# Patient Record
Sex: MALE | Race: WHITE | Employment: FULL TIME | URBAN - METROPOLITAN AREA
[De-identification: names, ages, dates, MRNs, and addresses within clinical notes are randomized per-mention and may not be internally consistent; named-entity substitution may affect disease eponyms.]

---

## 2022-11-09 ENCOUNTER — APPOINTMENT (OUTPATIENT)
Dept: CT IMAGING | Age: 37
End: 2022-11-09
Payer: COMMERCIAL

## 2022-11-09 ENCOUNTER — HOSPITAL ENCOUNTER (EMERGENCY)
Age: 37
Discharge: HOME | End: 2022-11-09
Attending: EMERGENCY MEDICINE
Payer: COMMERCIAL

## 2022-11-09 ENCOUNTER — APPOINTMENT (OUTPATIENT)
Dept: GENERAL RADIOLOGY | Age: 37
End: 2022-11-09
Payer: COMMERCIAL

## 2022-11-09 VITALS
HEART RATE: 89 BPM | HEIGHT: 74 IN | BODY MASS INDEX: 35.29 KG/M2 | SYSTOLIC BLOOD PRESSURE: 154 MMHG | TEMPERATURE: 98.1 F | OXYGEN SATURATION: 96 % | RESPIRATION RATE: 21 BRPM | DIASTOLIC BLOOD PRESSURE: 88 MMHG | WEIGHT: 275 LBS

## 2022-11-09 DIAGNOSIS — R00.2 PALPITATIONS: ICD-10-CM

## 2022-11-09 DIAGNOSIS — R91.1 LUNG NODULE: Primary | ICD-10-CM

## 2022-11-09 DIAGNOSIS — I51.7 LVH (LEFT VENTRICULAR HYPERTROPHY): ICD-10-CM

## 2022-11-09 LAB
ALBUMIN SERPL-MCNC: 4.7 G/DL (ref 3.5–5.2)
ALBUMIN/GLOBULIN RATIO: 1.6 (ref 1–2.5)
ALP BLD-CCNC: 88 U/L (ref 40–129)
ALT SERPL-CCNC: 67 U/L (ref 5–41)
ANION GAP SERPL CALCULATED.3IONS-SCNC: 10 MMOL/L (ref 9–17)
AST SERPL-CCNC: 31 U/L
BILIRUB SERPL-MCNC: 0.3 MG/DL (ref 0.3–1.2)
BUN BLDV-MCNC: 11 MG/DL (ref 6–20)
CALCIUM SERPL-MCNC: 9.8 MG/DL (ref 8.6–10.4)
CHLORIDE BLD-SCNC: 102 MMOL/L (ref 98–107)
CO2: 26 MMOL/L (ref 20–31)
CREAT SERPL-MCNC: 0.93 MG/DL (ref 0.7–1.2)
D-DIMER QUANTITATIVE: 1.46 MG/L FEU
GFR SERPL CREATININE-BSD FRML MDRD: >60 ML/MIN/1.73M2
GLUCOSE BLD-MCNC: 117 MG/DL (ref 70–99)
HCT VFR BLD CALC: 48.7 % (ref 41–53)
HEMOGLOBIN: 16.7 G/DL (ref 13.5–17.5)
MAGNESIUM: 2.2 MG/DL (ref 1.6–2.6)
MCH RBC QN AUTO: 29.7 PG (ref 26–34)
MCHC RBC AUTO-ENTMCNC: 34.4 G/DL (ref 31–37)
MCV RBC AUTO: 86.5 FL (ref 80–100)
PDW BLD-RTO: 13 % (ref 12.5–15.4)
PLATELET # BLD: 242 K/UL (ref 140–450)
PMV BLD AUTO: 8.9 FL (ref 6–12)
POTASSIUM SERPL-SCNC: 4 MMOL/L (ref 3.7–5.3)
PRO-BNP: 35 PG/ML
RBC # BLD: 5.63 M/UL (ref 4.5–5.9)
SODIUM BLD-SCNC: 138 MMOL/L (ref 135–144)
TOTAL PROTEIN: 7.7 G/DL (ref 6.4–8.3)
TROPONIN, HIGH SENSITIVITY: 6 NG/L (ref 0–22)
TROPONIN, HIGH SENSITIVITY: 7 NG/L (ref 0–22)
WBC # BLD: 7.8 K/UL (ref 3.5–11)

## 2022-11-09 PROCEDURE — 84484 ASSAY OF TROPONIN QUANT: CPT

## 2022-11-09 PROCEDURE — 2580000003 HC RX 258: Performed by: EMERGENCY MEDICINE

## 2022-11-09 PROCEDURE — 99285 EMERGENCY DEPT VISIT HI MDM: CPT

## 2022-11-09 PROCEDURE — 83880 ASSAY OF NATRIURETIC PEPTIDE: CPT

## 2022-11-09 PROCEDURE — 71260 CT THORAX DX C+: CPT | Performed by: EMERGENCY MEDICINE

## 2022-11-09 PROCEDURE — 85027 COMPLETE CBC AUTOMATED: CPT

## 2022-11-09 PROCEDURE — 71045 X-RAY EXAM CHEST 1 VIEW: CPT

## 2022-11-09 PROCEDURE — 93005 ELECTROCARDIOGRAM TRACING: CPT | Performed by: EMERGENCY MEDICINE

## 2022-11-09 PROCEDURE — 80053 COMPREHEN METABOLIC PANEL: CPT

## 2022-11-09 PROCEDURE — 6360000004 HC RX CONTRAST MEDICATION: Performed by: EMERGENCY MEDICINE

## 2022-11-09 PROCEDURE — 85379 FIBRIN DEGRADATION QUANT: CPT

## 2022-11-09 PROCEDURE — 36415 COLL VENOUS BLD VENIPUNCTURE: CPT

## 2022-11-09 PROCEDURE — 83735 ASSAY OF MAGNESIUM: CPT

## 2022-11-09 RX ORDER — 0.9 % SODIUM CHLORIDE 0.9 %
80 INTRAVENOUS SOLUTION INTRAVENOUS ONCE
Status: DISCONTINUED | OUTPATIENT
Start: 2022-11-09 | End: 2022-11-09 | Stop reason: HOSPADM

## 2022-11-09 RX ORDER — SODIUM CHLORIDE 0.9 % (FLUSH) 0.9 %
10 SYRINGE (ML) INJECTION PRN
Status: DISCONTINUED | OUTPATIENT
Start: 2022-11-09 | End: 2022-11-09 | Stop reason: HOSPADM

## 2022-11-09 RX ADMIN — Medication 80 ML: at 05:51

## 2022-11-09 RX ADMIN — IOPAMIDOL 75 ML: 755 INJECTION, SOLUTION INTRAVENOUS at 05:55

## 2022-11-09 RX ADMIN — SODIUM CHLORIDE, PRESERVATIVE FREE 10 ML: 5 INJECTION INTRAVENOUS at 05:56

## 2022-11-09 ASSESSMENT — ENCOUNTER SYMPTOMS
COUGH: 0
EYE DISCHARGE: 0
ABDOMINAL PAIN: 0
VOMITING: 0
SORE THROAT: 0
EYE REDNESS: 0
WHEEZING: 0
STRIDOR: 0
COLOR CHANGE: 0
DIARRHEA: 0
CONSTIPATION: 0
EYE PAIN: 0
NAUSEA: 1
SHORTNESS OF BREATH: 0

## 2022-11-09 ASSESSMENT — PAIN - FUNCTIONAL ASSESSMENT: PAIN_FUNCTIONAL_ASSESSMENT: NONE - DENIES PAIN

## 2022-11-09 NOTE — ED PROVIDER NOTES
Constitutional:  Positive for diaphoresis. Negative for activity change, appetite change, chills, fatigue and fever. HENT:  Negative for congestion, ear pain and sore throat. Eyes:  Negative for pain, discharge and redness. Respiratory:  Negative for cough, shortness of breath, wheezing and stridor. Cardiovascular:  Positive for palpitations. Negative for chest pain. Gastrointestinal:  Positive for nausea. Negative for abdominal pain, constipation, diarrhea and vomiting. Genitourinary:  Negative for decreased urine volume and difficulty urinating. Musculoskeletal:  Negative for arthralgias and myalgias. Skin:  Negative for color change and rash. Neurological:  Negative for dizziness, weakness and headaches. Psychiatric/Behavioral:  Negative for behavioral problems and confusion. Except as noted above the remainder of the review of systems was reviewed and negative. PAST MEDICAL HISTORY   History reviewed. No pertinent past medical history. SURGICAL HISTORY     History reviewed. No pertinent surgical history. CURRENT MEDICATIONS       Previous Medications    No medications on file       ALLERGIES     Patient has no known allergies. FAMILY HISTORY     History reviewed. No pertinent family history. No family status information on file. SOCIAL HISTORY      reports that he has never smoked. He has never been exposed to tobacco smoke. He has never used smokeless tobacco. He reports that he does not currently use alcohol. He reports that he does not use drugs. PHYSICAL EXAM    (up to 7 for level 4, 8 or more for level 5)     ED Triage Vitals [11/09/22 0419]   BP Temp Temp Source Heart Rate Resp SpO2 Height Weight   (!) 174/92 98.1 °F (36.7 °C) Oral 87 18 98 % 6' 2\" (1.88 m) 275 lb (124.7 kg)     Physical Exam  Vitals and nursing note reviewed. Constitutional:       General: He is not in acute distress. Appearance: He is well-developed.  He is not ill-appearing, toxic-appearing or diaphoretic. HENT:      Head: Normocephalic and atraumatic. Right Ear: External ear normal.      Left Ear: External ear normal.      Nose: Nose normal.      Mouth/Throat:      Mouth: Mucous membranes are moist.   Eyes:      General:         Right eye: No discharge. Left eye: No discharge. Conjunctiva/sclera: Conjunctivae normal.      Pupils: Pupils are equal, round, and reactive to light. Cardiovascular:      Rate and Rhythm: Normal rate and regular rhythm. Heart sounds: Normal heart sounds. No murmur heard. Pulmonary:      Effort: Pulmonary effort is normal. No respiratory distress. Breath sounds: Normal breath sounds. No wheezing, rhonchi or rales. Chest:      Chest wall: No tenderness. Abdominal:      General: Bowel sounds are normal. There is no distension. Palpations: Abdomen is soft. There is no mass. Tenderness: There is no abdominal tenderness. There is no guarding or rebound. Musculoskeletal:         General: No swelling or tenderness. Normal range of motion. Cervical back: Normal range of motion and neck supple. Right lower leg: No edema. Left lower leg: No edema. Skin:     General: Skin is warm. Findings: No rash. Neurological:      General: No focal deficit present. Mental Status: He is alert and oriented to person, place, and time. Motor: No abnormal muscle tone.    Psychiatric:         Mood and Affect: Mood normal.         Behavior: Behavior normal.        DIAGNOSTIC RESULTS     EKG: All EKG's are interpreted by the Emergency Department Physician who either signs or Co-signs this chart in the absence of a cardiologist.    EKG Interpretation    Interpreted by me    Rhythm: Sinus tachycardia  Rate: Tachycardic  Axis: normal  Ectopy: none  Conduction: normal  ST Segments: Nonspecific change  T Waves: no acute change  Q Waves: none  Left ventricular hypertrophy    Clinical Impression: Nonspecific changes and abnormal EKG    RADIOLOGY:   Non-plain film images such as CT, Ultrasound and MRI are read by the radiologist.   Interpretation per the Radiologist below, if available at the time of this note:    CT CHEST PULMONARY EMBOLISM W CONTRAST   Preliminary Result   1. No evidence of central or segmental pulmonary embolus. Subsegmental   pulmonary arteries are limited. 2.  No acute pulmonary process. 3.  A 0.5   cm pleural-based nodule is noted in the right middle lobe. No routine   follow-up imaging is recommended per [employer] guidelines. These   guidelines do not apply to immunocompromised patients and patients with   cancer. Follow up in patients with significant comorbidities as clinically   warranted. For lung cancer screening, adhere to Lung-RADS guidelines. Reference: Radiology. 2017; 284(1):228-43. XR CHEST PORTABLE   Final Result   No acute intrathoracic process. ED BEDSIDE ULTRASOUND:   Performed by ED Physician - none    LABS:  Labs Reviewed   COMPREHENSIVE METABOLIC PANEL - Abnormal; Notable for the following components:       Result Value    Glucose 117 (*)     ALT 67 (*)     All other components within normal limits   CBC   TROPONIN   TROPONIN   MAGNESIUM   BRAIN NATRIURETIC PEPTIDE   D-DIMER, QUANTITATIVE       All other labs were within normal range or not returned as of this dictation. EMERGENCY DEPARTMENT COURSE and DIFFERENTIAL DIAGNOSIS/MDM:   Vitals:    Vitals:    11/09/22 0515 11/09/22 0530 11/09/22 0545 11/09/22 0616   BP: 135/88 121/80 (!) 144/88 (!) 154/88   Pulse: 83 78 89 89   Resp: 19 10 23 21   Temp:       TempSrc:       SpO2: 97% 96% 97% 96%   Weight:       Height:         We did discuss lab work, imaging and EKG. Certainly concerned about cardiac event including arrhythmia. We will look at imaging for any sign of infection. Check for electrolyte disturbance. He is comfortable with plan of care as is family at bedside.   I have answered any questions they have at this time. We did go over results and I related to him that I think it would be good to go ahead and get a CT of the chest since D-dimer is elevated. They are agreeable. Have answered any questions they have at this time. I did go over CT of the chest results and talked about left ventricular hypertrophy and the need for good close follow-up with cardiology who will likely want an echo done. We verbalized understanding. I did talk about what to return the emergency department for as well. CONSULTS:  None    PROCEDURES:  None    FINAL IMPRESSION      1. Lung nodule    2. Palpitations    3.  LVH (left ventricular hypertrophy)          DISPOSITION/PLAN   DISPOSITION Decision To Discharge 11/09/2022 06:39:03 AM      PATIENT REFERRED TO:  Emily Jes   [address] [phone]    Call today      Cheryl Vance, DO [address] [facility].  [phone]    Call today  For appointment and cardiac echo as we discussed    DISCHARGE MEDICATIONS:  New Prescriptions    No medications on file       (Please note that portions of this note were completed with a voice recognition program.  Efforts were made to edit the dictations but occasionally words are mis-transcribed.)    Ayaan Soto MD  Attending Emergency Physician        Ayaan Soto MD  11/09/22 3296

## 2022-11-11 LAB
EKG ATRIAL RATE: 92 BPM
EKG P AXIS: 27 DEGREES
EKG P-R INTERVAL: 168 MS
EKG Q-T INTERVAL: 368 MS
EKG QRS DURATION: 96 MS
EKG QTC CALCULATION (BAZETT): 455 MS
EKG R AXIS: -9 DEGREES
EKG T AXIS: 15 DEGREES
EKG VENTRICULAR RATE: 92 BPM

## 2024-02-27 ENCOUNTER — HOSPITAL ENCOUNTER (EMERGENCY)
Age: 39
Discharge: HOME | End: 2024-02-28
Attending: EMERGENCY MEDICINE
Payer: COMMERCIAL

## 2024-02-27 DIAGNOSIS — R10.9 ABDOMINAL PAIN, UNSPECIFIED ABDOMINAL LOCATION: Primary | ICD-10-CM

## 2024-02-27 DIAGNOSIS — K76.0 STEATOSIS OF LIVER: ICD-10-CM

## 2024-02-27 PROCEDURE — 99284 EMERGENCY DEPT VISIT MOD MDM: CPT

## 2024-02-27 PROCEDURE — 81003 URINALYSIS AUTO W/O SCOPE: CPT

## 2024-02-27 RX ORDER — SODIUM CHLORIDE 0.9 % (FLUSH) 0.9 %
3 SYRINGE (ML) INJECTION EVERY 8 HOURS
Status: DISCONTINUED | OUTPATIENT
Start: 2024-02-27 | End: 2024-02-28 | Stop reason: HOSPADM

## 2024-02-27 ASSESSMENT — PAIN DESCRIPTION - PAIN TYPE: TYPE: ACUTE PAIN

## 2024-02-27 ASSESSMENT — PAIN - FUNCTIONAL ASSESSMENT: PAIN_FUNCTIONAL_ASSESSMENT: 0-10

## 2024-02-27 ASSESSMENT — PAIN SCALES - GENERAL: PAINLEVEL_OUTOF10: 3

## 2024-02-27 ASSESSMENT — PAIN DESCRIPTION - ORIENTATION: ORIENTATION: LEFT;UPPER

## 2024-02-27 ASSESSMENT — PAIN DESCRIPTION - LOCATION: LOCATION: ABDOMEN

## 2024-02-28 ENCOUNTER — APPOINTMENT (OUTPATIENT)
Dept: CT IMAGING | Age: 39
End: 2024-02-28
Payer: COMMERCIAL

## 2024-02-28 VITALS
HEIGHT: 74 IN | DIASTOLIC BLOOD PRESSURE: 89 MMHG | HEART RATE: 95 BPM | RESPIRATION RATE: 18 BRPM | TEMPERATURE: 98.6 F | SYSTOLIC BLOOD PRESSURE: 133 MMHG | OXYGEN SATURATION: 95 % | WEIGHT: 280 LBS | BODY MASS INDEX: 35.94 KG/M2

## 2024-02-28 LAB
ALBUMIN SERPL-MCNC: 4.5 G/DL (ref 3.5–5.2)
ALBUMIN/GLOB SERPL: 1.4 {RATIO} (ref 1–2.5)
ALP SERPL-CCNC: 78 U/L (ref 40–129)
ALT SERPL-CCNC: 56 U/L (ref 5–41)
ANION GAP SERPL CALCULATED.3IONS-SCNC: 11 MMOL/L (ref 9–17)
AST SERPL-CCNC: 25 U/L
BASOPHILS # BLD: 0.02 K/UL (ref 0–0.2)
BASOPHILS NFR BLD: 0 % (ref 0–2)
BILIRUB SERPL-MCNC: 0.2 MG/DL (ref 0.3–1.2)
BILIRUB UR QL STRIP: NEGATIVE
BUN SERPL-MCNC: 17 MG/DL (ref 6–20)
CALCIUM SERPL-MCNC: 9.9 MG/DL (ref 8.6–10.4)
CHLORIDE SERPL-SCNC: 101 MMOL/L (ref 98–107)
CLARITY UR: CLEAR
CO2 SERPL-SCNC: 25 MMOL/L (ref 20–31)
COLOR UR: YELLOW
COMMENT: NORMAL
CREAT SERPL-MCNC: 1.1 MG/DL (ref 0.7–1.2)
EOSINOPHIL # BLD: 0.08 K/UL (ref 0–0.4)
EOSINOPHILS RELATIVE PERCENT: 1 % (ref 1–4)
ERYTHROCYTE [DISTWIDTH] IN BLOOD BY AUTOMATED COUNT: 12.9 % (ref 12.5–15.4)
GFR SERPL CREATININE-BSD FRML MDRD: >60 ML/MIN/1.73M2
GLUCOSE SERPL-MCNC: 123 MG/DL (ref 70–99)
GLUCOSE UR STRIP-MCNC: NEGATIVE MG/DL
HCT VFR BLD AUTO: 47.4 % (ref 41–53)
HGB BLD-MCNC: 16.8 G/DL (ref 13.5–17.5)
HGB UR QL STRIP.AUTO: NEGATIVE
KETONES UR STRIP-MCNC: NEGATIVE MG/DL
LEUKOCYTE ESTERASE UR QL STRIP: NEGATIVE
LIPASE SERPL-CCNC: 24 U/L (ref 13–60)
LYMPHOCYTES NFR BLD: 2.8 K/UL (ref 1–4.8)
LYMPHOCYTES RELATIVE PERCENT: 39 % (ref 24–44)
MCH RBC QN AUTO: 30.4 PG (ref 26–34)
MCHC RBC AUTO-ENTMCNC: 35.4 G/DL (ref 31–37)
MCV RBC AUTO: 85.9 FL (ref 80–100)
MONOCYTES NFR BLD: 0.7 K/UL (ref 0.1–1.2)
MONOCYTES NFR BLD: 10 % (ref 2–11)
NEUTROPHILS NFR BLD: 50 % (ref 36–66)
NEUTS SEG NFR BLD: 3.63 K/UL (ref 1.8–7.7)
NITRITE UR QL STRIP: NEGATIVE
PH UR STRIP: 6.5 [PH] (ref 5–8)
PLATELET # BLD AUTO: 234 K/UL (ref 140–450)
PMV BLD AUTO: 11.3 FL (ref 8–14)
POTASSIUM SERPL-SCNC: 3.9 MMOL/L (ref 3.7–5.3)
PROT SERPL-MCNC: 7.7 G/DL (ref 6.4–8.3)
PROT UR STRIP-MCNC: NEGATIVE MG/DL
RBC # BLD AUTO: 5.52 M/UL (ref 4.5–5.9)
SODIUM SERPL-SCNC: 137 MMOL/L (ref 135–144)
SP GR UR STRIP: 1.02 (ref 1–1.03)
UROBILINOGEN UR STRIP-ACNC: NORMAL EU/DL (ref 0–1)
WBC OTHER # BLD: 7.2 K/UL (ref 3.5–11)

## 2024-02-28 PROCEDURE — 85025 COMPLETE CBC W/AUTO DIFF WBC: CPT

## 2024-02-28 PROCEDURE — 83690 ASSAY OF LIPASE: CPT

## 2024-02-28 PROCEDURE — 80053 COMPREHEN METABOLIC PANEL: CPT

## 2024-02-28 PROCEDURE — 2580000003 HC RX 258: Performed by: EMERGENCY MEDICINE

## 2024-02-28 PROCEDURE — 74176 CT ABD & PELVIS W/O CONTRAST: CPT

## 2024-02-28 RX ADMIN — SODIUM CHLORIDE, PRESERVATIVE FREE 3 ML: 5 INJECTION INTRAVENOUS at 02:45

## 2024-02-28 ASSESSMENT — ENCOUNTER SYMPTOMS
EYE DISCHARGE: 0
ABDOMINAL PAIN: 1
SORE THROAT: 0
EYE REDNESS: 0
DIARRHEA: 0
CONSTIPATION: 0
COUGH: 0
WHEEZING: 0
STRIDOR: 0
NAUSEA: 0
EYE PAIN: 0
COLOR CHANGE: 0
SHORTNESS OF BREATH: 0
VOMITING: 0

## 2024-02-28 ASSESSMENT — PAIN DESCRIPTION - LOCATION: LOCATION: ABDOMEN

## 2024-02-28 ASSESSMENT — PAIN - FUNCTIONAL ASSESSMENT: PAIN_FUNCTIONAL_ASSESSMENT: 0-10

## 2024-02-28 ASSESSMENT — PAIN SCALES - GENERAL: PAINLEVEL_OUTOF10: 3

## 2024-02-28 NOTE — ED PROVIDER NOTES
[facility] EMERGENCY DEPARTMENT  EMERGENCY DEPARTMENT ENCOUNTER      Pt Name: Cyrus Barrow  MRN: [id number]  Birthdate 1985  Date of evaluation: 2/27/2024  Provider: Shiela Cifuentes MD    CHIEF COMPLAINT       Chief Complaint   Patient presents with    Abdominal Pain     Pt arrives with co upper left abdominal pain. Pt states the pain has been intermittent for a \"few mths\". Pt states he usually feels bloating however tonight he is experiencing pain and bloating. Pt has not been evaluated for this.        HISTORY OF PRESENT ILLNESS  (Location/Symptom, Timing/Onset, Context/Setting, Quality, Duration, Modifying Factors, Severity.)   Cyrus Barrow is a 38 y.o. male who presents to the emergency department of upper left abdominal pain.  He relates it has been intermittent for a few months now.  Tonight he feels like it is more pain and bloating and has not yet been evaluated for this in the past.  He denies any nausea or vomiting.  He relates he has been having bowel movements like normal and no problems with urination so he is not sure exactly what is going on.  He does relate that sometimes eating makes it feel better.    Nursing Notes were reviewed.    REVIEW OF SYSTEMS    (2-9 systems for level 4, 10 or more for level 5)     Review of Systems   Constitutional:  Negative for activity change, appetite change, chills, fatigue and fever.   HENT:  Negative for congestion, ear pain and sore throat.    Eyes:  Negative for pain, discharge and redness.   Respiratory:  Negative for cough, shortness of breath, wheezing and stridor.    Cardiovascular:  Negative for chest pain.   Gastrointestinal:  Positive for abdominal pain. Negative for constipation, diarrhea, nausea and vomiting.   Genitourinary:  Negative for decreased urine volume and difficulty urinating.   Musculoskeletal:  Negative for arthralgias and myalgias.   Skin:  Negative for color change and rash.   Neurological:  Negative for dizziness,

## 2024-12-17 ENCOUNTER — HOSPITAL ENCOUNTER (OUTPATIENT)
Dept: VASCULAR LAB | Age: 39
Discharge: HOME | End: 2024-12-19
Payer: COMMERCIAL

## 2024-12-17 DIAGNOSIS — I1A.0 RESISTANT HYPERTENSION: ICD-10-CM

## 2024-12-17 DIAGNOSIS — R94.31 ABNORMAL ECG: ICD-10-CM

## 2024-12-17 PROCEDURE — 93975 VASCULAR STUDY: CPT

## 2024-12-18 LAB
VAS AORTA MID PSV: 124 CM/S
VAS AORTA PROX PSV: 124 CM/S
VAS L RENAL ORIG RI: 0.6
VAS LEFT KIDNEY LENGTH: 12.86 CM
VAS LEFT RENAL DIST EDV: 24.8 CM/S
VAS LEFT RENAL DIST PSV: 56.7 CM/S
VAS LEFT RENAL DIST RAR: 0.46
VAS LEFT RENAL DIST RI: 0.56
VAS LEFT RENAL MID EDV: 18.7 CM/S
VAS LEFT RENAL MID PSV: 53.1 CM/S
VAS LEFT RENAL MID RAR: 0.43
VAS LEFT RENAL MID RI: 0.65
VAS LEFT RENAL ORIGIN EDV: 40.6 CM/S
VAS LEFT RENAL ORIGIN PSV: 102.7 CM/S
VAS LEFT RENAL ORIGIN RAR: 0.83
VAS LEFT RENAL PROX EDV: 49.7 CM/S
VAS LEFT RENAL PROX PSV: 117.3 CM/S
VAS LEFT RENAL PROX RAR: 0.95
VAS LEFT RENAL PROX RI: 0.58
VAS LEFT RENAL RAR: 0.95
VAS RIGHT KIDNEY LENGTH: 12.62 CM
VAS RIGHT RENAL DIST EDV: 38.2 CM/S
VAS RIGHT RENAL DIST PSV: 74.7 CM/S
VAS RIGHT RENAL DIST RAR: 0.6
VAS RIGHT RENAL DIST RI: 0.49
VAS RIGHT RENAL MID EDV: 40 CM/S
VAS RIGHT RENAL MID PSV: 102.1 CM/S
VAS RIGHT RENAL MID RAR: 0.82
VAS RIGHT RENAL MID RI: 0.61
VAS RIGHT RENAL ORIGIN EDV: 49.1 CM/S
VAS RIGHT RENAL ORIGIN PSV: 116.7 CM/S
VAS RIGHT RENAL ORIGIN RAR: 0.94
VAS RIGHT RENAL ORIGIN RI: 0.58
VAS RIGHT RENAL PROX EDV: 38.2 CM/S
VAS RIGHT RENAL PROX PSV: 91.1 CM/S
VAS RIGHT RENAL PROX RAR: 0.73
VAS RIGHT RENAL PROX RI: 0.58
VAS RIGHT RENAL RAR: 0.94

## 2024-12-18 PROCEDURE — 93975 VASCULAR STUDY: CPT | Performed by: STUDENT IN AN ORGANIZED HEALTH CARE EDUCATION/TRAINING PROGRAM
